# Patient Record
(demographics unavailable — no encounter records)

---

## 2025-03-17 NOTE — HISTORY OF PRESENT ILLNESS
[de-identified] : Mr. CROW  is s/p Excision of right buttock mass on  02/28/2025. Patient's pathology results were  consistent with Fibroadipose tissue with organizing abscess. Today  Mr. CROW offers no complaints. patient reports no fever or  chills. patient reports occasional discomfort in the surgical area.  His surgical incisions are healing well. No signs of inflammation, infection or exudate. patient reports good bowel movements and appetite.

## 2025-03-17 NOTE — DATA REVIEWED
[FreeTextEntry1] :   	 [facility] Accession Number : [id number] Patient:     SILVERIO CROW   Accession:                             [id number]  Collected Date/Time:                   2/28/2025 14:59 EST Received Date/Time:                    3/3/2025 09:11 EST  Surgical Pathology Report - Auth (Verified)  Specimen(s) Submitted 1  Right buttock mass  Final Diagnosis Mass, right buttock; excision: Add diagnosis -   Fibroadipose tissue with organizing abscess.  Verified by: Deanna Goss MD (Electronic Signature) Reported on: 03/07/25 14:38 EST, [address] Phone: [phone]   Fax: [phone] _________________________________________________________________  Clinical Information Right buttock mass

## 2025-03-17 NOTE — DATA REVIEWED
[FreeTextEntry1] :   	 [facility] Accession Number : [id number] Patient:     SILVERIO CROW   Accession:                             [id number]  Collected Date/Time:                   2/28/2025 14:59 EST Received Date/Time:                    3/3/2025 09:11 EST  Surgical Pathology Report - Auth (Verified)  Specimen(s) Submitted 1  Right buttock mass  Final Diagnosis Mass, right buttock; excision: Add diagnosis -   Fibroadipose tissue with organizing abscess.  Verified by: Deanna Goss MD (Electronic Signature) Reported on: 03/07/25 14:38 EST, [address] Phone: [phone]   Fax: [phone] _________________________________________________________________  Clinical Information Right buttock mass

## 2025-03-17 NOTE — ASSESSMENT
[FreeTextEntry1] : Mr. CROW is doing well, with excellent post-operative recovery. The surgical incision is healing well and as expected. There is no evidence of infection or complication, and patient is progressing as expected. Post-operative wound care, activity, restrictions and precautions reinforced.  Pathology results were discussed in details. Patient's questions and concerns addressed to patient's satisfaction.

## 2025-03-17 NOTE — PHYSICAL EXAM
[Alert] : alert [Oriented to Person] : oriented to person [Oriented to Place] : oriented to place [Oriented to Time] : oriented to time [Calm] : calm [de-identified] : He  is alert, well-groomed, and in NAD   [de-identified] : anicteric.  Nasal mucosa pink, septum midline. Oral mucosa pink.  Tongue midline, Pharynx without exudates.   [de-identified] : Neck supple. Trachea midline. Thyroid isthmus barely palpable, lobes not felt.   [de-identified] : right buttock Surgical wound is healing well.   no signs of  inflammation or infection.

## 2025-03-17 NOTE — PHYSICAL EXAM
[Alert] : alert [Oriented to Person] : oriented to person [Oriented to Place] : oriented to place [Oriented to Time] : oriented to time [Calm] : calm [de-identified] : He  is alert, well-groomed, and in NAD   [de-identified] : anicteric.  Nasal mucosa pink, septum midline. Oral mucosa pink.  Tongue midline, Pharynx without exudates.   [de-identified] : Neck supple. Trachea midline. Thyroid isthmus barely palpable, lobes not felt.   [de-identified] : right buttock Surgical wound is healing well.   no signs of  inflammation or infection.

## 2025-03-17 NOTE — HISTORY OF PRESENT ILLNESS
[de-identified] : Mr. CROW  is s/p Excision of right buttock mass on  02/28/2025. Patient's pathology results were  consistent with Fibroadipose tissue with organizing abscess. Today  Mr. CROW offers no complaints. patient reports no fever or  chills. patient reports occasional discomfort in the surgical area.  His surgical incisions are healing well. No signs of inflammation, infection or exudate. patient reports good bowel movements and appetite.

## 2025-04-03 NOTE — HISTORY OF PRESENT ILLNESS
[de-identified] : Mr. CROW  is s/p Excision of right buttock mass on  02/28/2025. Patient's pathology results were  consistent with Fibroadipose tissue with organizing abscess.  He returns  with the chief complaint of having  a recurrent abscess .  He reports having this condition last week and states that it self drained 6 days ago.     He denies any trauma to the area.   He denies any fever or  night sweats. Appetite is good and weight is stable.    he completed the PO abx and he feel much better now

## 2025-04-03 NOTE — PHYSICAL EXAM
[Fistula] : a fistula [Normal] : was normal [None] : no [Alert] : alert [Oriented to Person] : oriented to person [Oriented to Place] : oriented to place [Oriented to Time] : oriented to time [Calm] : calm [de-identified] : He  is alert, well-groomed, and in NAD   [de-identified] : anicteric.  Nasal mucosa pink, septum midline. Oral mucosa pink.  Tongue midline, Pharynx without exudates.   [de-identified] : Neck supple. Trachea midline. Thyroid isthmus barely palpable, lobes not felt.

## 2025-04-03 NOTE — HISTORY OF PRESENT ILLNESS
[de-identified] : Mr. CROW  is s/p Excision of right buttock mass on  02/28/2025. Patient's pathology results were  consistent with Fibroadipose tissue with organizing abscess.  He returns  with the chief complaint of having  a recurrent abscess .  He reports having this condition last week and states that it self drained 6 days ago.     He denies any trauma to the area.   He denies any fever or  night sweats. Appetite is good and weight is stable.    he completed the PO abx and he feel much better now

## 2025-04-03 NOTE — PHYSICAL EXAM
[Fistula] : a fistula [Normal] : was normal [None] : no [Alert] : alert [Oriented to Person] : oriented to person [Oriented to Place] : oriented to place [Oriented to Time] : oriented to time [Calm] : calm [de-identified] : He  is alert, well-groomed, and in NAD   [de-identified] : anicteric.  Nasal mucosa pink, septum midline. Oral mucosa pink.  Tongue midline, Pharynx without exudates.   [de-identified] : Neck supple. Trachea midline. Thyroid isthmus barely palpable, lobes not felt.

## 2025-04-03 NOTE — PLAN
[FreeTextEntry1] : Mr. CROW  was told significance of findings, options, risks and benefits were explained.   All surgical options were discussed including non-surgical treatments.   he has deep wyatt rectal fistula and was advised  to have MR pelvis to determine extent of the disease. he will return after the test  Patient advised to seek immediate medical attention with any acute change in symptoms or with the development of any new or worsening symptoms.  Patient's questions and concerns addressed to patient's satisfaction, and patient verbalized an understanding of the information discussed.

## 2025-04-28 NOTE — ASSESSMENT
[FreeTextEntry1] : Impression:  MRI showed Enhancing transsphincteric fistula extending from proximal a cyclic position of the rectum to the right subcutaneous gluteal fat with the enhancing tract measuring approximately 6.5 cm in length

## 2025-04-28 NOTE — HISTORY OF PRESENT ILLNESS
[de-identified] : This is  a 68 year   old patient presenting today for a  follow up  and to discuss the results of his most recent  imaging.  Mr. CROW  is s/p Excision of right buttock mass on  02/28/2025. Patient's pathology results were  consistent with Fibroadipose tissue with organizing abscess.  Mr. CROW  is s/p MRI pelvis on 04/14/2025 that showed  Enhancing transsphincteric fistula extending from proximal a cyclic position of the rectum to the right subcutaneous gluteal fat with the enhancing tract measuring approximately 6.5 cm in length. Patient reports no interval changes to her overall health status or medical history

## 2025-04-28 NOTE — PLAN
[FreeTextEntry1] : Mr. CROW  was told significance of findings, options, risks and benefits were explained.     All surgical options were discussed including non-surgical treatments.   he was advised to consult DR Ellis ( colorectal surgery). Mr. CROW will follow up  if needed. Warning signs, follow up, and restrictions were discussed with the patient.  Patient advised to seek immediate medical attention with any acute change in symptoms or with the development of any new or worsening symptoms.  Patient's questions and concerns addressed to patient's satisfaction, and patient verbalized an understanding of the information discussed.

## 2025-04-28 NOTE — HISTORY OF PRESENT ILLNESS
[de-identified] : This is  a 68 year   old patient presenting today for a  follow up  and to discuss the results of his most recent  imaging.  Mr. CROW  is s/p Excision of right buttock mass on  02/28/2025. Patient's pathology results were  consistent with Fibroadipose tissue with organizing abscess.  Mr. CROW  is s/p MRI pelvis on 04/14/2025 that showed  Enhancing transsphincteric fistula extending from proximal a cyclic position of the rectum to the right subcutaneous gluteal fat with the enhancing tract measuring approximately 6.5 cm in length. Patient reports no interval changes to her overall health status or medical history

## 2025-04-28 NOTE — DATA REVIEWED
[FreeTextEntry1] : EXAM: [id number] - MR PELVIS WAW IC  - ORDERED BY: AUGUSTO SMITHEMMAETTA   PROCEDURE DATE:  04/14/2025    INTERPRETATION:  CLINICAL INFORMATION: Perirectal abscess  COMPARISON: None.  CONTRAST/COMPLICATIONS: IV Contrast: Gadavist  9.5 cc administered   0.5 cc discarded Oral Contrast: NONE .  PROCEDURE: MRI of the pelvis was performed as per Anal Fistula Protocol with attention to the anal canal.   FINDINGS:  ANAL FISTULA: Present: Yes  LOCATION: MUCOSAL OPENING (based on axial images): 6:00 location series 12 image 32 and 33 EXTERNAL OPENING: Right gluteal subcutaneous tissue CLASSIFICATION: Transsphincteric ACTIVITY: Active tract (Fluid with peripheral enhancement) COURSE OF FISTULOUS TRACT: Relatively horizontal extending into the gluteal fat DISTANCE FROM MUCOSAL DEFECT TO THE PERIANAL SKIN (measured on coronal imaging): 6.5 cm ADDITIONAL COMMENTS: This edematous changes is seen in the right subcutaneous gluteal fat without organized abscess with diffuse enhancement seen in the region SECONDARY FISTULA OR ABSCESS (description if present): None  REPRODUCTIVE ORGANS: Within normal limits. BLADDER: Within normal limits. LYMPH NODES: No pelvic lymphadenopathy.  VISUALIZED PORTIONS: PERITONEUM: No ascites. VESSELS: Within normal limits. ABDOMINAL WALL: Within normal limits. BONES: Within normal limits.  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 Apr 21 2025  2:12PM

## 2025-04-28 NOTE — DATA REVIEWED
[FreeTextEntry1] : EXAM: [id number] - MR PELVIS WAW IC  - ORDERED BY: AUGUSTO SMITHEMMAETTA   PROCEDURE DATE:  04/14/2025    INTERPRETATION:  CLINICAL INFORMATION: Perirectal abscess  COMPARISON: None.  CONTRAST/COMPLICATIONS: IV Contrast: Gadavist  9.5 cc administered   0.5 cc discarded Oral Contrast: NONE .  PROCEDURE: MRI of the pelvis was performed as per Anal Fistula Protocol with attention to the anal canal.   FINDINGS:  ANAL FISTULA: Present: Yes  LOCATION: MUCOSAL OPENING (based on axial images): 6:00 location series 12 image 32 and 33 EXTERNAL OPENING: Right gluteal subcutaneous tissue CLASSIFICATION: Transsphincteric ACTIVITY: Active tract (Fluid with peripheral enhancement) COURSE OF FISTULOUS TRACT: Relatively horizontal extending into the gluteal fat DISTANCE FROM MUCOSAL DEFECT TO THE PERIANAL SKIN (measured on coronal imaging): 6.5 cm ADDITIONAL COMMENTS: This edematous changes is seen in the right subcutaneous gluteal fat without organized abscess with diffuse enhancement seen in the region SECONDARY FISTULA OR ABSCESS (description if present): None  REPRODUCTIVE ORGANS: Within normal limits. BLADDER: Within normal limits. LYMPH NODES: No pelvic lymphadenopathy.  VISUALIZED PORTIONS: PERITONEUM: No ascites. VESSELS: Within normal limits. ABDOMINAL WALL: Within normal limits. BONES: Within normal limits.  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 Apr 21 2025  2:12PM

## 2025-04-28 NOTE — PHYSICAL EXAM
[Alert] : alert [Oriented to Person] : oriented to person [Oriented to Place] : oriented to place [Oriented to Time] : oriented to time [Calm] : calm [de-identified] : He  is alert, well-groomed, and in NAD   [de-identified] : anicteric.  Nasal mucosa pink, septum midline. Oral mucosa pink.  Tongue midline, Pharynx without exudates.   [de-identified] : Neck supple. Trachea midline. Thyroid isthmus barely palpable, lobes not felt.

## 2025-04-28 NOTE — PHYSICAL EXAM
[Alert] : alert [Oriented to Person] : oriented to person [Oriented to Place] : oriented to place [Oriented to Time] : oriented to time [Calm] : calm [de-identified] : He  is alert, well-groomed, and in NAD   [de-identified] : anicteric.  Nasal mucosa pink, septum midline. Oral mucosa pink.  Tongue midline, Pharynx without exudates.   [de-identified] : Neck supple. Trachea midline. Thyroid isthmus barely palpable, lobes not felt.

## 2025-05-22 NOTE — ASSESSMENT
[FreeTextEntry1] : Anal fistula  -No active fistula appreciated.  MRI was likely taken when active fistula was present but it appears to have healed. - No surgical intervention at this time - We discussed the possibility of recurrence.  Patient will follow-up in office with an active flare in which I would better be able to intervene - Patient agreed to this plan and will follow-up as described - All questions answered

## 2025-05-22 NOTE — CONSULT LETTER
[Dear  ___] : Dear  [unfilled], [Consult Letter:] : I had the pleasure of evaluating your patient, [unfilled]. [Please see my note below.] : Please see my note below. [Consult Closing:] : Thank you very much for allowing me to participate in the care of this patient.  If you have any questions, please do not hesitate to contact me. [Sincerely,] : Sincerely, [FreeTextEntry2] : Max Demarco [FreeTextEntry3] : Amrit Ellis MD FACS Chief Colon and Rectal Surgery [facility]

## 2025-05-22 NOTE — PHYSICAL EXAM
[Abdomen Masses] : No abdominal masses [Abdomen Tenderness] : ~T No ~M abdominal tenderness [JVD] : no jugular venous distention  [Wheezing] : no wheezing was heard [No Rash or Lesion] : No rash or lesion [Alert] : alert [Oriented to Person] : oriented to person [Oriented to Place] : oriented to place [Oriented to Time] : oriented to time [Calm] : calm [de-identified] : No apparent distress [de-identified] : Extraocular motion intact [de-identified] : Moves all extremities [FreeTextEntry1] : External anal exam-abscess incision and drainage site completely healed no palpable cord or fistula Digital rectal exam normal tone Anoscopy-procedure performed in standard fashion under direct vision with a standard adult anoscope.  Patient tolerated without event or complication. - No proctitis no signs of internal mass or lesion

## 2025-05-22 NOTE — HISTORY OF PRESENT ILLNESS
[FreeTextEntry1] : 69 yo male s/p drainage of Right gluteal abscess.  MRI showed likely transphincteric fistula.  Pt currently denies pain, drainage of swelling in the area.  No fevers or chills.  He reports this has happened previously.  No abd pain.  Normal BMs.  Colonoscopy 4 years ago was normal by report.  No family hx of colon cancer or IBD MRI shows transit enteric fistula with abscess cavity.

## 2025-05-22 NOTE — HISTORY OF PRESENT ILLNESS
[FreeTextEntry1] : 67 yo male s/p drainage of Right gluteal abscess.  MRI showed likely transphincteric fistula.  Pt currently denies pain, drainage of swelling in the area.  No fevers or chills.  He reports this has happened previously.  No abd pain.  Normal BMs.  Colonoscopy 4 years ago was normal by report.  No family hx of colon cancer or IBD MRI shows transit enteric fistula with abscess cavity.

## 2025-05-22 NOTE — CONSULT LETTER
[Dear  ___] : Dear  [unfilled], [Consult Letter:] : I had the pleasure of evaluating your patient, [unfilled]. [Please see my note below.] : Please see my note below. [Consult Closing:] : Thank you very much for allowing me to participate in the care of this patient.  If you have any questions, please do not hesitate to contact me. [Sincerely,] : Sincerely, [FreeTextEntry2] : Max Demarco [FreeTextEntry3] : Amrit Ellis MD FACS Chief Colon and Rectal Surgery [facility]

## 2025-05-22 NOTE — PHYSICAL EXAM
[Abdomen Masses] : No abdominal masses [Abdomen Tenderness] : ~T No ~M abdominal tenderness [JVD] : no jugular venous distention  [Wheezing] : no wheezing was heard [No Rash or Lesion] : No rash or lesion [Alert] : alert [Oriented to Person] : oriented to person [Oriented to Place] : oriented to place [Oriented to Time] : oriented to time [Calm] : calm [de-identified] : No apparent distress [de-identified] : Extraocular motion intact [de-identified] : Moves all extremities [FreeTextEntry1] : External anal exam-abscess incision and drainage site completely healed no palpable cord or fistula Digital rectal exam normal tone Anoscopy-procedure performed in standard fashion under direct vision with a standard adult anoscope.  Patient tolerated without event or complication. - No proctitis no signs of internal mass or lesion

## 2025-06-12 NOTE — PHYSICAL EXAM
[FreeTextEntry1] : Alert and oriented x 3 Moves all extremities no edema Abdomen soft nontender No rashes External anal exam right lateral incision site clean dry and intact no fluctuance or drainage no signs of fistula.  Left lateral resolving thrombosed external hemorrhoid nontender currently Statement Selected

## 2025-06-12 NOTE — HISTORY OF PRESENT ILLNESS
[FreeTextEntry1] : 69 yo male w/ hx of perianal abscess and MRI showing presents for swelling on the opposite, left side.  Patient denies drainage no fevers or chills no blood per rectum denies pain in the area no aggravating factors

## 2025-06-12 NOTE — HISTORY OF PRESENT ILLNESS
[FreeTextEntry1] : 67 yo male w/ hx of perianal abscess and MRI showing presents for swelling on the opposite, left side.  Patient denies drainage no fevers or chills no blood per rectum denies pain in the area no aggravating factors

## 2025-06-12 NOTE — PHYSICAL EXAM
[FreeTextEntry1] : Alert and oriented x 3 Moves all extremities no edema Abdomen soft nontender No rashes External anal exam right lateral incision site clean dry and intact no fluctuance or drainage no signs of fistula.  Left lateral resolving thrombosed external hemorrhoid nontender currently

## 2025-06-12 NOTE — ASSESSMENT
[FreeTextEntry1] : Thrombosed external hemorrhoid - MRI images and report reviewed to confirm location of previous noted fistula - No signs of fistula at this time - Resolving thrombosed hemorrhoid - I recommended conservative therapy - Hydrocortisone cream - Sitz bath's as needed - Fiber supplementation - Patient to follow-up with right sided discomfort or swelling or any other issues